# Patient Record
Sex: MALE | Race: WHITE | NOT HISPANIC OR LATINO | Employment: FULL TIME
[De-identification: names, ages, dates, MRNs, and addresses within clinical notes are randomized per-mention and may not be internally consistent; named-entity substitution may affect disease eponyms.]

---

## 2020-03-01 ENCOUNTER — HEALTH MAINTENANCE LETTER (OUTPATIENT)
Age: 50
End: 2020-03-01

## 2020-12-14 ENCOUNTER — HEALTH MAINTENANCE LETTER (OUTPATIENT)
Age: 50
End: 2020-12-14

## 2021-04-17 ENCOUNTER — HEALTH MAINTENANCE LETTER (OUTPATIENT)
Age: 51
End: 2021-04-17

## 2021-07-22 DIAGNOSIS — Z11.59 ENCOUNTER FOR SCREENING FOR OTHER VIRAL DISEASES: ICD-10-CM

## 2021-07-29 ENCOUNTER — LAB (OUTPATIENT)
Dept: LAB | Facility: CLINIC | Age: 51
End: 2021-07-29
Payer: COMMERCIAL

## 2021-07-29 DIAGNOSIS — Z11.59 ENCOUNTER FOR SCREENING FOR OTHER VIRAL DISEASES: ICD-10-CM

## 2021-07-29 LAB — SARS-COV-2 RNA RESP QL NAA+PROBE: NEGATIVE

## 2021-07-29 PROCEDURE — U0003 INFECTIOUS AGENT DETECTION BY NUCLEIC ACID (DNA OR RNA); SEVERE ACUTE RESPIRATORY SYNDROME CORONAVIRUS 2 (SARS-COV-2) (CORONAVIRUS DISEASE [COVID-19]), AMPLIFIED PROBE TECHNIQUE, MAKING USE OF HIGH THROUGHPUT TECHNOLOGIES AS DESCRIBED BY CMS-2020-01-R: HCPCS

## 2021-07-29 PROCEDURE — U0005 INFEC AGEN DETEC AMPLI PROBE: HCPCS

## 2021-07-29 RX ORDER — LISINOPRIL/HYDROCHLOROTHIAZIDE 10-12.5 MG
1 TABLET ORAL DAILY
COMMUNITY

## 2021-08-02 ENCOUNTER — ANESTHESIA (OUTPATIENT)
Dept: SURGERY | Facility: CLINIC | Age: 51
End: 2021-08-02
Payer: COMMERCIAL

## 2021-08-02 ENCOUNTER — ANESTHESIA EVENT (OUTPATIENT)
Dept: SURGERY | Facility: CLINIC | Age: 51
End: 2021-08-02
Payer: COMMERCIAL

## 2021-08-02 ENCOUNTER — HOSPITAL ENCOUNTER (OUTPATIENT)
Facility: CLINIC | Age: 51
Discharge: HOME | End: 2021-08-03
Attending: ORTHOPAEDIC SURGERY | Admitting: ORTHOPAEDIC SURGERY
Payer: COMMERCIAL

## 2021-08-02 ENCOUNTER — ANCILLARY PROCEDURE (OUTPATIENT)
Dept: ULTRASOUND IMAGING | Facility: CLINIC | Age: 51
End: 2021-08-02
Attending: ANESTHESIOLOGY
Payer: COMMERCIAL

## 2021-08-02 DIAGNOSIS — Z98.890 STATUS POST RIGHT ROTATOR CUFF REPAIR: Primary | ICD-10-CM

## 2021-08-02 LAB
CREAT SERPL-MCNC: 0.94 MG/DL (ref 0.7–1.3)
ERYTHROCYTE [DISTWIDTH] IN BLOOD BY AUTOMATED COUNT: 12.3 % (ref 10–15)
GFR SERPL CREATININE-BSD FRML MDRD: >90 ML/MIN/1.73M2
HCT VFR BLD AUTO: 50.1 % (ref 40–53)
HGB BLD-MCNC: 16.6 G/DL (ref 13.3–17.7)
HGB BLD-MCNC: 16.6 G/DL (ref 13.3–17.7)
HOLD SPECIMEN: NORMAL
MCH RBC QN AUTO: 31.1 PG (ref 26.5–33)
MCHC RBC AUTO-ENTMCNC: 33.1 G/DL (ref 31.5–36.5)
MCV RBC AUTO: 94 FL (ref 78–100)
PLATELET # BLD AUTO: 140 10E3/UL (ref 150–450)
POTASSIUM BLD-SCNC: 4.1 MMOL/L (ref 3.5–5)
RBC # BLD AUTO: 5.34 10E6/UL (ref 4.4–5.9)
WBC # BLD AUTO: 5.5 10E3/UL (ref 4–11)

## 2021-08-02 PROCEDURE — 250N000025 HC SEVOFLURANE, PER MIN: Performed by: ORTHOPAEDIC SURGERY

## 2021-08-02 PROCEDURE — 250N000011 HC RX IP 250 OP 636: Performed by: NURSE ANESTHETIST, CERTIFIED REGISTERED

## 2021-08-02 PROCEDURE — 250N000011 HC RX IP 250 OP 636: Performed by: PHYSICIAN ASSISTANT

## 2021-08-02 PROCEDURE — C9290 INJ, BUPIVACAINE LIPOSOME: HCPCS | Performed by: PHYSICIAN ASSISTANT

## 2021-08-02 PROCEDURE — 258N000001 HC RX 258: Performed by: ORTHOPAEDIC SURGERY

## 2021-08-02 PROCEDURE — 250N000009 HC RX 250: Performed by: NURSE ANESTHETIST, CERTIFIED REGISTERED

## 2021-08-02 PROCEDURE — 250N000009 HC RX 250: Performed by: ANESTHESIOLOGY

## 2021-08-02 PROCEDURE — 99207 PR CDG-CODE CATEGORY CHANGED: CPT | Performed by: INTERNAL MEDICINE

## 2021-08-02 PROCEDURE — 250N000013 HC RX MED GY IP 250 OP 250 PS 637: Performed by: INTERNAL MEDICINE

## 2021-08-02 PROCEDURE — 85027 COMPLETE CBC AUTOMATED: CPT | Performed by: PHYSICIAN ASSISTANT

## 2021-08-02 PROCEDURE — C1776 JOINT DEVICE (IMPLANTABLE): HCPCS | Performed by: ORTHOPAEDIC SURGERY

## 2021-08-02 PROCEDURE — 999N000141 HC STATISTIC PRE-PROCEDURE NURSING ASSESSMENT: Performed by: ORTHOPAEDIC SURGERY

## 2021-08-02 PROCEDURE — 250N000011 HC RX IP 250 OP 636: Performed by: ANESTHESIOLOGY

## 2021-08-02 PROCEDURE — 360N000077 HC SURGERY LEVEL 4, PER MIN: Performed by: ORTHOPAEDIC SURGERY

## 2021-08-02 PROCEDURE — 999N000157 HC STATISTIC RCP TIME EA 10 MIN

## 2021-08-02 PROCEDURE — 258N000003 HC RX IP 258 OP 636: Performed by: ANESTHESIOLOGY

## 2021-08-02 PROCEDURE — 94660 CPAP INITIATION&MGMT: CPT

## 2021-08-02 PROCEDURE — 710N000010 HC RECOVERY PHASE 1, LEVEL 2, PER MIN: Performed by: ORTHOPAEDIC SURGERY

## 2021-08-02 PROCEDURE — 250N000011 HC RX IP 250 OP 636: Performed by: ORTHOPAEDIC SURGERY

## 2021-08-02 PROCEDURE — 250N000013 HC RX MED GY IP 250 OP 250 PS 637: Performed by: PHYSICIAN ASSISTANT

## 2021-08-02 PROCEDURE — 36415 COLL VENOUS BLD VENIPUNCTURE: CPT | Performed by: PHYSICIAN ASSISTANT

## 2021-08-02 PROCEDURE — 370N000017 HC ANESTHESIA TECHNICAL FEE, PER MIN: Performed by: ORTHOPAEDIC SURGERY

## 2021-08-02 PROCEDURE — 84132 ASSAY OF SERUM POTASSIUM: CPT | Performed by: PHYSICIAN ASSISTANT

## 2021-08-02 PROCEDURE — 272N000001 HC OR GENERAL SUPPLY STERILE: Performed by: ORTHOPAEDIC SURGERY

## 2021-08-02 PROCEDURE — 99233 SBSQ HOSP IP/OBS HIGH 50: CPT | Performed by: INTERNAL MEDICINE

## 2021-08-02 PROCEDURE — C1713 ANCHOR/SCREW BN/BN,TIS/BN: HCPCS | Performed by: ORTHOPAEDIC SURGERY

## 2021-08-02 PROCEDURE — 82565 ASSAY OF CREATININE: CPT | Performed by: PHYSICIAN ASSISTANT

## 2021-08-02 DEVICE — SUTURETAPE, 1.3MM W/NEEDLE, WHITE/BLUE
Type: IMPLANTABLE DEVICE | Site: SHOULDER | Status: FUNCTIONAL
Brand: ARTHREX®

## 2021-08-02 DEVICE — IMP ANCHOR ARTHREX BIO-SWIVELOCK 4.75X19.1MM AR-2324BCC: Type: IMPLANTABLE DEVICE | Site: SHOULDER | Status: FUNCTIONAL

## 2021-08-02 DEVICE — 4.75MM BC KNOTLESS SWIVELOCK W/ TAPE
Type: IMPLANTABLE DEVICE | Site: SHOULDER | Status: FUNCTIONAL
Brand: ARTHREX®

## 2021-08-02 RX ORDER — FENTANYL CITRATE 50 UG/ML
25 INJECTION, SOLUTION INTRAMUSCULAR; INTRAVENOUS EVERY 5 MIN PRN
Status: DISCONTINUED | OUTPATIENT
Start: 2021-08-02 | End: 2021-08-02 | Stop reason: HOSPADM

## 2021-08-02 RX ORDER — IBUPROFEN 200 MG
600 TABLET ORAL EVERY 6 HOURS PRN
COMMUNITY

## 2021-08-02 RX ORDER — CEFAZOLIN SODIUM IN 0.9 % NACL 3 G/100 ML
3 INTRAVENOUS SOLUTION, PIGGYBACK (ML) INTRAVENOUS
Status: COMPLETED | OUTPATIENT
Start: 2021-08-02 | End: 2021-08-02

## 2021-08-02 RX ORDER — NALOXONE HYDROCHLORIDE 0.4 MG/ML
0.4 INJECTION, SOLUTION INTRAMUSCULAR; INTRAVENOUS; SUBCUTANEOUS
Status: DISCONTINUED | OUTPATIENT
Start: 2021-08-02 | End: 2021-08-03 | Stop reason: HOSPADM

## 2021-08-02 RX ORDER — IBUPROFEN 600 MG/1
600 TABLET, FILM COATED ORAL EVERY 6 HOURS PRN
Status: DISCONTINUED | OUTPATIENT
Start: 2021-08-02 | End: 2021-08-03 | Stop reason: HOSPADM

## 2021-08-02 RX ORDER — OXYCODONE HYDROCHLORIDE 5 MG/1
10 TABLET ORAL EVERY 4 HOURS PRN
Status: DISCONTINUED | OUTPATIENT
Start: 2021-08-02 | End: 2021-08-03 | Stop reason: HOSPADM

## 2021-08-02 RX ORDER — ONDANSETRON 4 MG/1
4 TABLET, ORALLY DISINTEGRATING ORAL EVERY 30 MIN PRN
Status: CANCELLED | OUTPATIENT
Start: 2021-08-02

## 2021-08-02 RX ORDER — HYDROXYZINE PAMOATE 25 MG/1
25-50 CAPSULE ORAL EVERY 4 HOURS PRN
Qty: 30 CAPSULE | Refills: 0 | Status: SHIPPED | OUTPATIENT
Start: 2021-08-02

## 2021-08-02 RX ORDER — BISACODYL 10 MG
10 SUPPOSITORY, RECTAL RECTAL DAILY PRN
Status: DISCONTINUED | OUTPATIENT
Start: 2021-08-02 | End: 2021-08-03 | Stop reason: HOSPADM

## 2021-08-02 RX ORDER — LIDOCAINE 40 MG/G
CREAM TOPICAL
Status: DISCONTINUED | OUTPATIENT
Start: 2021-08-02 | End: 2021-08-03 | Stop reason: HOSPADM

## 2021-08-02 RX ORDER — BUPIVACAINE HYDROCHLORIDE AND EPINEPHRINE 5; 5 MG/ML; UG/ML
INJECTION, SOLUTION PERINEURAL
Status: COMPLETED | OUTPATIENT
Start: 2021-08-02 | End: 2021-08-02

## 2021-08-02 RX ORDER — MAGNESIUM SULFATE 4 G/50ML
4 INJECTION INTRAVENOUS ONCE
Status: COMPLETED | OUTPATIENT
Start: 2021-08-02 | End: 2021-08-02

## 2021-08-02 RX ORDER — PROCHLORPERAZINE MALEATE 10 MG
10 TABLET ORAL EVERY 6 HOURS PRN
Status: DISCONTINUED | OUTPATIENT
Start: 2021-08-02 | End: 2021-08-03 | Stop reason: HOSPADM

## 2021-08-02 RX ORDER — EPINEPHRINE 0.3 MG/.3ML
0.3 INJECTION SUBCUTANEOUS PRN
COMMUNITY

## 2021-08-02 RX ORDER — ACETAMINOPHEN 325 MG/1
975 TABLET ORAL ONCE
Status: COMPLETED | OUTPATIENT
Start: 2021-08-02 | End: 2021-08-02

## 2021-08-02 RX ORDER — CEFAZOLIN SODIUM IN 0.9 % NACL 3 G/100 ML
3 INTRAVENOUS SOLUTION, PIGGYBACK (ML) INTRAVENOUS
Status: DISCONTINUED | OUTPATIENT
Start: 2021-08-02 | End: 2021-08-02

## 2021-08-02 RX ORDER — HYDROXYZINE HYDROCHLORIDE 25 MG/1
25-50 TABLET, FILM COATED ORAL EVERY 6 HOURS PRN
Status: DISCONTINUED | OUTPATIENT
Start: 2021-08-02 | End: 2021-08-03 | Stop reason: HOSPADM

## 2021-08-02 RX ORDER — FENTANYL CITRATE 50 UG/ML
50 INJECTION, SOLUTION INTRAMUSCULAR; INTRAVENOUS
Status: DISCONTINUED | OUTPATIENT
Start: 2021-08-02 | End: 2021-08-02 | Stop reason: HOSPADM

## 2021-08-02 RX ORDER — NALOXONE HYDROCHLORIDE 0.4 MG/ML
0.2 INJECTION, SOLUTION INTRAMUSCULAR; INTRAVENOUS; SUBCUTANEOUS
Status: DISCONTINUED | OUTPATIENT
Start: 2021-08-02 | End: 2021-08-03 | Stop reason: HOSPADM

## 2021-08-02 RX ORDER — ONDANSETRON 2 MG/ML
INJECTION INTRAMUSCULAR; INTRAVENOUS PRN
Status: DISCONTINUED | OUTPATIENT
Start: 2021-08-02 | End: 2021-08-02

## 2021-08-02 RX ORDER — HALOPERIDOL 5 MG/ML
1 INJECTION INTRAMUSCULAR
Status: CANCELLED | OUTPATIENT
Start: 2021-08-02

## 2021-08-02 RX ORDER — ONDANSETRON 2 MG/ML
4 INJECTION INTRAMUSCULAR; INTRAVENOUS EVERY 30 MIN PRN
Status: CANCELLED | OUTPATIENT
Start: 2021-08-02

## 2021-08-02 RX ORDER — CEFAZOLIN SODIUM IN 0.9 % NACL 3 G/100 ML
3 INTRAVENOUS SOLUTION, PIGGYBACK (ML) INTRAVENOUS SEE ADMIN INSTRUCTIONS
Status: DISCONTINUED | OUTPATIENT
Start: 2021-08-02 | End: 2021-08-02 | Stop reason: HOSPADM

## 2021-08-02 RX ORDER — SODIUM PHOSPHATE,MONO-DIBASIC 19G-7G/118
1 ENEMA (ML) RECTAL DAILY
COMMUNITY

## 2021-08-02 RX ORDER — ALBUTEROL SULFATE 0.83 MG/ML
2.5 SOLUTION RESPIRATORY (INHALATION) EVERY 4 HOURS PRN
Status: DISCONTINUED | OUTPATIENT
Start: 2021-08-02 | End: 2021-08-02 | Stop reason: HOSPADM

## 2021-08-02 RX ORDER — AMOXICILLIN 250 MG
1 CAPSULE ORAL 2 TIMES DAILY
Status: DISCONTINUED | OUTPATIENT
Start: 2021-08-02 | End: 2021-08-03 | Stop reason: HOSPADM

## 2021-08-02 RX ORDER — PROPOFOL 10 MG/ML
INJECTION, EMULSION INTRAVENOUS PRN
Status: DISCONTINUED | OUTPATIENT
Start: 2021-08-02 | End: 2021-08-02

## 2021-08-02 RX ORDER — POLYETHYLENE GLYCOL 3350 17 G/17G
17 POWDER, FOR SOLUTION ORAL DAILY
Status: DISCONTINUED | OUTPATIENT
Start: 2021-08-03 | End: 2021-08-03 | Stop reason: HOSPADM

## 2021-08-02 RX ORDER — SUMATRIPTAN 100 MG/1
100 TABLET, FILM COATED ORAL
COMMUNITY

## 2021-08-02 RX ORDER — CEFAZOLIN SODIUM 2 G/100ML
2 INJECTION, SOLUTION INTRAVENOUS
Status: DISCONTINUED | OUTPATIENT
Start: 2021-08-02 | End: 2021-08-02

## 2021-08-02 RX ORDER — ACETAMINOPHEN 325 MG/1
650 TABLET ORAL EVERY 4 HOURS PRN
Status: DISCONTINUED | OUTPATIENT
Start: 2021-08-05 | End: 2021-08-03 | Stop reason: HOSPADM

## 2021-08-02 RX ORDER — OXYCODONE HYDROCHLORIDE 5 MG/1
5-10 TABLET ORAL EVERY 4 HOURS PRN
Qty: 30 TABLET | Refills: 0 | Status: SHIPPED | OUTPATIENT
Start: 2021-08-02

## 2021-08-02 RX ORDER — KETAMINE HYDROCHLORIDE 10 MG/ML
INJECTION INTRAMUSCULAR; INTRAVENOUS PRN
Status: DISCONTINUED | OUTPATIENT
Start: 2021-08-02 | End: 2021-08-02

## 2021-08-02 RX ORDER — SODIUM CHLORIDE, SODIUM LACTATE, POTASSIUM CHLORIDE, CALCIUM CHLORIDE 600; 310; 30; 20 MG/100ML; MG/100ML; MG/100ML; MG/100ML
INJECTION, SOLUTION INTRAVENOUS CONTINUOUS
Status: DISCONTINUED | OUTPATIENT
Start: 2021-08-02 | End: 2021-08-03 | Stop reason: HOSPADM

## 2021-08-02 RX ORDER — LISINOPRIL/HYDROCHLOROTHIAZIDE 10-12.5 MG
1 TABLET ORAL DAILY
Status: DISCONTINUED | OUTPATIENT
Start: 2021-08-02 | End: 2021-08-03 | Stop reason: HOSPADM

## 2021-08-02 RX ORDER — PROPRANOLOL HYDROCHLORIDE 10 MG/1
10 TABLET ORAL DAILY PRN
COMMUNITY

## 2021-08-02 RX ORDER — ONDANSETRON 2 MG/ML
4 INJECTION INTRAMUSCULAR; INTRAVENOUS EVERY 6 HOURS PRN
Status: DISCONTINUED | OUTPATIENT
Start: 2021-08-02 | End: 2021-08-03 | Stop reason: HOSPADM

## 2021-08-02 RX ORDER — OXYCODONE HYDROCHLORIDE 5 MG/1
5 TABLET ORAL EVERY 4 HOURS PRN
Status: DISCONTINUED | OUTPATIENT
Start: 2021-08-02 | End: 2021-08-02

## 2021-08-02 RX ORDER — DEXAMETHASONE SODIUM PHOSPHATE 10 MG/ML
INJECTION, SOLUTION INTRAMUSCULAR; INTRAVENOUS PRN
Status: DISCONTINUED | OUTPATIENT
Start: 2021-08-02 | End: 2021-08-02

## 2021-08-02 RX ORDER — ACETAMINOPHEN 325 MG/1
975 TABLET ORAL EVERY 8 HOURS
Status: DISCONTINUED | OUTPATIENT
Start: 2021-08-02 | End: 2021-08-03 | Stop reason: HOSPADM

## 2021-08-02 RX ORDER — CEFAZOLIN SODIUM 2 G/100ML
2 INJECTION, SOLUTION INTRAVENOUS EVERY 8 HOURS
Status: COMPLETED | OUTPATIENT
Start: 2021-08-02 | End: 2021-08-03

## 2021-08-02 RX ORDER — HYDROMORPHONE HCL IN WATER/PF 6 MG/30 ML
0.2 PATIENT CONTROLLED ANALGESIA SYRINGE INTRAVENOUS EVERY 5 MIN PRN
Status: DISCONTINUED | OUTPATIENT
Start: 2021-08-02 | End: 2021-08-02 | Stop reason: HOSPADM

## 2021-08-02 RX ORDER — ONDANSETRON 4 MG/1
4 TABLET, ORALLY DISINTEGRATING ORAL EVERY 6 HOURS PRN
Status: DISCONTINUED | OUTPATIENT
Start: 2021-08-02 | End: 2021-08-03 | Stop reason: HOSPADM

## 2021-08-02 RX ORDER — HYDROMORPHONE HCL IN WATER/PF 6 MG/30 ML
0.2 PATIENT CONTROLLED ANALGESIA SYRINGE INTRAVENOUS
Status: DISCONTINUED | OUTPATIENT
Start: 2021-08-02 | End: 2021-08-03 | Stop reason: HOSPADM

## 2021-08-02 RX ORDER — SUMATRIPTAN 50 MG/1
100 TABLET, FILM COATED ORAL
Status: DISCONTINUED | OUTPATIENT
Start: 2021-08-02 | End: 2021-08-03 | Stop reason: HOSPADM

## 2021-08-02 RX ORDER — OXYCODONE HYDROCHLORIDE 5 MG/1
5 TABLET ORAL EVERY 4 HOURS PRN
Status: DISCONTINUED | OUTPATIENT
Start: 2021-08-02 | End: 2021-08-03 | Stop reason: HOSPADM

## 2021-08-02 RX ORDER — LIDOCAINE 40 MG/G
CREAM TOPICAL
Status: DISCONTINUED | OUTPATIENT
Start: 2021-08-02 | End: 2021-08-02 | Stop reason: HOSPADM

## 2021-08-02 RX ORDER — SODIUM CHLORIDE, SODIUM LACTATE, POTASSIUM CHLORIDE, CALCIUM CHLORIDE 600; 310; 30; 20 MG/100ML; MG/100ML; MG/100ML; MG/100ML
INJECTION, SOLUTION INTRAVENOUS CONTINUOUS
Status: DISCONTINUED | OUTPATIENT
Start: 2021-08-02 | End: 2021-08-02 | Stop reason: HOSPADM

## 2021-08-02 RX ORDER — FENTANYL CITRATE 50 UG/ML
INJECTION, SOLUTION INTRAMUSCULAR; INTRAVENOUS PRN
Status: DISCONTINUED | OUTPATIENT
Start: 2021-08-02 | End: 2021-08-02

## 2021-08-02 RX ORDER — CEFAZOLIN SODIUM 2 G/100ML
2 INJECTION, SOLUTION INTRAVENOUS SEE ADMIN INSTRUCTIONS
Status: DISCONTINUED | OUTPATIENT
Start: 2021-08-02 | End: 2021-08-02

## 2021-08-02 RX ORDER — HYDROMORPHONE HCL IN WATER/PF 6 MG/30 ML
0.4 PATIENT CONTROLLED ANALGESIA SYRINGE INTRAVENOUS
Status: DISCONTINUED | OUTPATIENT
Start: 2021-08-02 | End: 2021-08-03 | Stop reason: HOSPADM

## 2021-08-02 RX ORDER — MULTIVITAMIN WITH IRON
2 TABLET ORAL DAILY
COMMUNITY

## 2021-08-02 RX ORDER — LIDOCAINE HYDROCHLORIDE 10 MG/ML
INJECTION, SOLUTION INFILTRATION; PERINEURAL PRN
Status: DISCONTINUED | OUTPATIENT
Start: 2021-08-02 | End: 2021-08-02

## 2021-08-02 RX ORDER — MULTIVITAMIN,THERAPEUTIC
1 TABLET ORAL DAILY
COMMUNITY

## 2021-08-02 RX ORDER — ASPIRIN 325 MG
325 TABLET, DELAYED RELEASE (ENTERIC COATED) ORAL DAILY
Qty: 14 TABLET | COMMUNITY
Start: 2021-08-02

## 2021-08-02 RX ORDER — MEPERIDINE HYDROCHLORIDE 50 MG/ML
12.5 INJECTION INTRAMUSCULAR; INTRAVENOUS; SUBCUTANEOUS
Status: CANCELLED | OUTPATIENT
Start: 2021-08-02

## 2021-08-02 RX ADMIN — ROCURONIUM BROMIDE 20 MG: 10 INJECTION, SOLUTION INTRAVENOUS at 12:36

## 2021-08-02 RX ADMIN — FENTANYL CITRATE 50 MCG: 50 INJECTION, SOLUTION INTRAMUSCULAR; INTRAVENOUS at 10:55

## 2021-08-02 RX ADMIN — MAGNESIUM SULFATE HEPTAHYDRATE 4 G: 80 INJECTION, SOLUTION INTRAVENOUS at 11:04

## 2021-08-02 RX ADMIN — HYDROMORPHONE HYDROCHLORIDE 0.25 MG: 1 INJECTION, SOLUTION INTRAMUSCULAR; INTRAVENOUS; SUBCUTANEOUS at 12:39

## 2021-08-02 RX ADMIN — PROPOFOL 200 MG: 10 INJECTION, EMULSION INTRAVENOUS at 11:55

## 2021-08-02 RX ADMIN — ROCURONIUM BROMIDE 50 MG: 10 INJECTION, SOLUTION INTRAVENOUS at 11:55

## 2021-08-02 RX ADMIN — DOCUSATE SODIUM 50MG AND SENNOSIDES 8.6MG 1 TABLET: 8.6; 5 TABLET, FILM COATED ORAL at 20:20

## 2021-08-02 RX ADMIN — DEXAMETHASONE SODIUM PHOSPHATE 10 MG: 10 INJECTION, SOLUTION INTRAMUSCULAR; INTRAVENOUS at 11:55

## 2021-08-02 RX ADMIN — FENTANYL CITRATE 50 MCG: 50 INJECTION, SOLUTION INTRAMUSCULAR; INTRAVENOUS at 11:55

## 2021-08-02 RX ADMIN — ACETAMINOPHEN 975 MG: 325 TABLET ORAL at 20:20

## 2021-08-02 RX ADMIN — SUGAMMADEX 300 MG: 100 INJECTION, SOLUTION INTRAVENOUS at 14:41

## 2021-08-02 RX ADMIN — ASPIRIN 325 MG: 325 TABLET, COATED ORAL at 20:20

## 2021-08-02 RX ADMIN — KETAMINE HYDROCHLORIDE 50 MG: 10 INJECTION, SOLUTION INTRAMUSCULAR; INTRAVENOUS at 12:36

## 2021-08-02 RX ADMIN — ACETAMINOPHEN 975 MG: 325 TABLET ORAL at 10:20

## 2021-08-02 RX ADMIN — MIDAZOLAM HYDROCHLORIDE 2 MG: 1 INJECTION, SOLUTION INTRAMUSCULAR; INTRAVENOUS at 10:55

## 2021-08-02 RX ADMIN — SODIUM CHLORIDE, POTASSIUM CHLORIDE, SODIUM LACTATE AND CALCIUM CHLORIDE: 600; 310; 30; 20 INJECTION, SOLUTION INTRAVENOUS at 13:46

## 2021-08-02 RX ADMIN — HYDROMORPHONE HYDROCHLORIDE 0.5 MG: 1 INJECTION, SOLUTION INTRAMUSCULAR; INTRAVENOUS; SUBCUTANEOUS at 14:32

## 2021-08-02 RX ADMIN — SODIUM CHLORIDE, POTASSIUM CHLORIDE, SODIUM LACTATE AND CALCIUM CHLORIDE: 600; 310; 30; 20 INJECTION, SOLUTION INTRAVENOUS at 11:03

## 2021-08-02 RX ADMIN — LISINOPRIL AND HYDROCHLOROTHIAZIDE 1 TABLET: 12.5; 1 TABLET ORAL at 20:21

## 2021-08-02 RX ADMIN — ONDANSETRON 4 MG: 2 INJECTION INTRAMUSCULAR; INTRAVENOUS at 14:32

## 2021-08-02 RX ADMIN — BUPIVACAINE 10 ML: 13.3 INJECTION, SUSPENSION, LIPOSOMAL INFILTRATION at 11:04

## 2021-08-02 RX ADMIN — FENTANYL CITRATE 50 MCG: 50 INJECTION, SOLUTION INTRAMUSCULAR; INTRAVENOUS at 11:49

## 2021-08-02 RX ADMIN — BUPIVACAINE HYDROCHLORIDE AND EPINEPHRINE BITARTRATE 15 ML: 5; .005 INJECTION, SOLUTION PERINEURAL at 11:13

## 2021-08-02 RX ADMIN — Medication 3 G: at 12:15

## 2021-08-02 RX ADMIN — HYDROMORPHONE HYDROCHLORIDE 0.25 MG: 1 INJECTION, SOLUTION INTRAMUSCULAR; INTRAVENOUS; SUBCUTANEOUS at 12:52

## 2021-08-02 RX ADMIN — CEFAZOLIN SODIUM 2 G: 2 INJECTION, SOLUTION INTRAVENOUS at 20:21

## 2021-08-02 RX ADMIN — ROCURONIUM BROMIDE 10 MG: 10 INJECTION, SOLUTION INTRAVENOUS at 13:03

## 2021-08-02 ASSESSMENT — MIFFLIN-ST. JEOR: SCORE: 2512.79

## 2021-08-02 NOTE — PHARMACY-ADMISSION MEDICATION HISTORY
Pharmacist completed medication history with the patient while in the [facility].  Prior to admission (PTA) med list completed and updated in the electronic medical record (EMR).  Pharmacy Note - Admission Medication History  Pertinent Provider Information: none   ______________________________________________________________________  Prior To Admission (PTA) med list completed and updated in EMR.     Medications Prior to Admission   Medication Sig Dispense Refill Last Dose     EPINEPHrine (ANY BX GENERIC EQUIV) 0.3 MG/0.3ML injection 2-pack Inject 0.3 mg into the muscle as needed for anaphylaxis   never     glucosamine-chondroitin 500-400 MG CAPS per capsule Take 1 capsule by mouth daily   More than a month     ibuprofen (ADVIL/MOTRIN) 200 MG tablet Take 600 mg by mouth every 6 hours as needed for mild pain   7/19/2021     lisinopril-hydrochlorothiazide (ZESTORETIC) 10-12.5 MG tablet Take 1 tablet by mouth daily   7/31/2021     magnesium 250 MG tablet Take 2 tablets by mouth daily   7/31/2021     multivitamin, therapeutic (THERA-VIT) TABS tablet Take 1 tablet by mouth daily   7/31/2021     propranolol (INDERAL) 10 MG tablet Take 10 mg by mouth daily as needed (prior to speaking)   More than a month     SUMAtriptan (IMITREX) 100 MG tablet Take 100 mg by mouth at onset of headache for migraine (repeat in two hours one time (max 200mg /24hrs))   More than a month         Information source(s): Patient and CareEverywhere/SureScripts  Patient was asked about OTC/herbal products specifically.  PTA med list reflects this.  Based on the pharmacist s assessment, the PTA med list information appears reliable  Allergies were reviewed, assessed, and updated with the patient.    Medications available for use during hospital stay: NONE  Thank you for the opportunity to participate in the care of this patient.    The patient was asked about OTC/herbal products specifically and the PTA med list reflects the patient's  response.    Allergies were reviewed and assessed with the patient, responses were updated in the EMR.    Thank you for the opportunity to participate in the care of this patient.    Ar Thorne RPH 8/2/2021 10:48 AM

## 2021-08-02 NOTE — PROVIDER NOTIFICATION
Out from the OR and placed on BiPAP ST 12/5, RR 16, 30% for tachypnea. Has history of JOSE ANTONIO - per [facility] off CPAP for 3 months with the use of an oral appliance. Continue at this time as needed.       08/02/21 1511   Tech Time   $Tech Time (10 minute increments) 3   Mode: CPAP/ BiPAP/ AVAPS/ AVAPS AE   CPAP/BiPAP/ AVAPS/ AVAPS AE Mode BiPAP S/T   Equipment   Device Serial Number [id number]   CPAP/BiPAP/Settings   $BIPAP/CPAP Therapy continuous   IPAP/EPAP (cmH2O) 12/5   Rate (breaths/min) 16   Oxygen (%) 30   Timed Inspiration (sec) 0.9   IPAP Rise Time (sec) 1 sec   CPAP/BiPAP Patient Parameters   IPAP (cm H2O) 12 cmH2O   EPAP (cm H2O) 5 cmH2O   RR Total (breaths/min) 35 breaths/min   Vt (mL) 734 mL   Minute Ventilation (L/min) 21.6 L/min   Peak Inspiratory Pressure (cm H2O) 13 cmH2O   Pt.  Leak (L/min) 50 L/min   CPAP/BiPAP/AVAPS/AVAPS AE Alarms   High Pressure (cm H2O) 17 cmH2O   Low Pressure (cm H2O) 5   Low Pressure Delay (sec) 20 sec   Lo Min Vent 3   High Rate (breaths/min) 45 breaths/min   Low Rate (breaths/min) 14   Audible Alarm set at (Volume of Alarm) 7   CPAP/BiPAP/AVAPS/AVAPS AE Patient Assessment   Interface Face Mask - Large   Skin Integrity no breakdown   RT Device Skin Assessment   Oxygen Delivery Device CPAP/BiPAP Mask   Site Appearance neck circumference Clean and dry   Site Appearance bridge of nose Clean and dry   Strap Tightness Finger Allowance between head and device strap   Skin Interventions Taken No adjustments needed   Respiratory WDL   Respiratory WDL rhythm/pattern   Rhythm/Pattern, Respiratory tachypneic

## 2021-08-02 NOTE — ANESTHESIA PROCEDURE NOTES
Interscalene Procedure Note  Pre-Procedure   Staff -        Anesthesiologist:  eMlissa Nesbitt MD       Performed By: anesthesiologist       Location: pre-op       Procedure Start/Stop Times: 8/2/2021 10:58 AM and 8/2/2021 11:05 AM       Pre-Anesthestic Checklist: patient identified, IV checked, site marked, risks and benefits discussed, informed consent, monitors and equipment checked, pre-op evaluation, at physician/surgeon's request and post-op pain management  Timeout:       Correct Patient: Yes        Correct Procedure: Yes        Correct Site: Yes        Correct Position: Yes        Correct Laterality: Yes        Site Marked: Yes  Procedure Documentation  Procedure: Interscalene       Laterality: right       Patient Position: supine       Patient Prep/Sterile Barriers: sterile gloves, mask       Skin prep: Chloraprep       Needle Type: short bevel       Needle Gauge: 20.        Needle Length (Inches): 4        Ultrasound guided       1. Ultrasound was used to identify targeted nerve, plexus, vascular marker, or fascial plane and place a needle adjacent to it in real-time.       2. Ultrasound was used to visualize the spread of anesthetic in close proximity to the above referenced structure.       3. A permanent image is entered into the patient's record.       4. The visualized anatomic structures appeared normal.       5. There were no apparent abnormal pathologic findings.    Assessment/Narrative         The placement was negative for: blood aspirated, painful injection and site bleeding       Paresthesias: No.      Bolus given via needle. No blood aspirated via catheter.        Secured via.        Insertion/Infusion Method: Single Shot       Complications: none    Medication(s) Administered   Bupivacaine 0.5% w/ 1:200K Epi (Other), 15 mL

## 2021-08-02 NOTE — ANESTHESIA CARE TRANSFER NOTE
Patient: Sander Stafford    Procedure(s):  RIGHT SHOULDER ARTHROSCOPY ROTATOR CUFF REPAIR  CONVERTED TO OPEN ROTATOR CUFF REPAIR    Diagnosis: Right shoulder pain [M25.511]  Diagnosis Additional Information: No value filed.    Anesthesia Type:   General     Note:  Anesthesia Care Transfer Notewriter  Vitals:  Vitals Value Taken Time   /86 08/02/21 1513   Temp     Pulse 80 08/02/21 1514   Resp 35 08/02/21 1514   SpO2 96 % 08/02/21 1514   Vitals shown include unvalidated device data.    Electronically Signed By: CONSTANCE LINARES CRNA  August 2, 2021  3:15 PM

## 2021-08-02 NOTE — OP NOTE
Operative Note    Name:  Sander Stafford  :  1970  MRN:  [id number]  Procedure Date:  2021    Preoperative Diagnosis:     Right Shoulder Rotator cuff tear    Postoperative Diagnosis:  Same with massive rotator cuff tear acute supraspinatus infraspinatus and subacute subscapularis tear    Procedures:  1.Right shoulder open rotator cuff repair  2. Right shoulder arthroscopy and debridement    Surgeon(s):   Rakesh Gonzalez MD, M.D.     Assistants:  Jossie Dent PA-C PA-C assistance was required due to the complexity of the procedure, for patient positioning, instrumentation assistance, soft tissue retraction and patient safety.      Circulator: Mojgan Bernabe RN; Rolando Lucas RN  Relief Circulator: Karrie Feliciano RN; Anna Marquez RN  Scrub Person: Diamond Banerjee    Anesthesia:   General and Interscalene block    Estimated Blood Loss: 50cc    Condition on discharge from OR: Stable    Drains: none     Specimens: None    Tissue Removed, Not Sent: None    Complications: none     Disposition:  Stable and awake to postanesthesia recovery..    INDICATION FOR OPERATION:  Sander Stafford is a 50 year old male with a history of shoulder pain and weakness and he has failed nonsurgical treatment. MRI showed evidence of  Large rotator cuff tear of the shoulder. Recommended he undergo shoulder rotator cuff repair.   Risks and benefits of the planned procedure as well as details of surgery were discussed with the patient. Consent was obtained.     REPORT OF OPERATION   The patient brought to the operating room and placed supine on the operating table. An interscalene block was placed by anesthesia preoperatively and given appropriate preoperative antibiotic. After induction of general anesthesia the patient was placed in the beachchair position on the OR table. On examination under anesthesia findings include full passive range of motion of the shoulder, no stiffness, no instability. The shoulder was  then prepped and draped in normal sterile fashion.    A standard posterior glenohumeral arthroscopy portal was established.  Diagnostic arthroscopy was performed. Arthroscopic findings include:    1. No significant arthritis of the glenohumeral joint articular surface.   2. Full thickness acute rotator cuff tear of the supraspinatus and infraspinatus, retracted but mobile  3. Subacute to chronic tear of the upper 1.5 cm of the subscapularis with chronic medial biceps subluxation  4. Mild subacromial impingement   5. Degenerative labral tearing      An anterior portal was established in the rotator interval and a cannula was placed.  A debridement of the glenohumeral joint was performed using a shaver to debride the labrum back to stable tissue.  No significant labral detachment was noted.       The scope was removed and placed in subacromial space. A complete subacromial bursectomy was performed. Bursal edge of the rotator cuff was débrided back to healthy cuff tissue.  Rotator cuff was probed and noted to be mobile to the bone bed.  Traction sutures were placed in the supraspinatus and infraspinatus to facilitate later repair.  Subscapularis is noted to have chronic tearing of the upper 1.5 cm of the subscapularis with chronic medial biceps subluxation.      Due to the large size of the tear and difficulty with visualization due to the body habitus it was elected to convert to an open repair.  Anterolateral deltoid splitting incision was made in line with the lateral portal.  Deep retractors placed below the deltoid and significant subacromial and subdeltoid adhesions and bursal tissue was resected.  Bone both repaired on the greater and lesser tuberosities down to bleeding bony surface using a rongeur and curette.  Subscapularis was repaired with 2 Medial Row anchors, one 4.75 swivel lock and one 2.6 mm fiber tack anchor.  These were passed through the cuff sequentially in a mattress fashion retrieved and tied and  suture tails brought out laterally to a 4.75 swivel lock anchor in the upper aspect of biceps groove.  Excellent to row repair of the subscap is accomplished.  Remnant of the biceps tendon was tenodesed to the anchor anteriorly.   Supraspinatus and infraspinatus and then repaired with speed bridge repair with three medial row 4.75 swivel lock anchors with fiber tapes and two 4.75 swivel lock anchors laterally.  In addition two side to side sutures at placed posteriorly across the split in the posterior aspect of the tear with suture tapes.  Excellent secure repair of the cuff is noted.  Minor open acromioplasty was formed using rongeur of the anterolateral acromion.  Incisions copiously with saline irrigation deltoid is closed with interrupted suture tape and Ethibond sutures.  Skin was closed with 0 Vicryl and 2-0 Vicryl subcutaneous closure and 3-0 Monocryl subcuticular closure.  Sterile dressings placed on the shoulder was placed in a shoulder slingshot brace transferred in stable condition to postanesthesia recovery.  He will be maintained in a sling for 6 weeks postop, progressing with physical therapy 3 to 4 weeks postop for passive range of motion, active assisted range of motion at 6 to 8 weeks postop, active range of motion in 8 weeks postop and strengthening at 12 weeks postop.          All instruments removed and incisions closed using 3-0 Monocryl subcuticular sutures, benzoin and Steri-Strips. Sterile dressings was placed on the shoulder, he was placed in a shoulder sling and transferred in stable awake condition to postanesthesia recovery.      Rakesh Gonzalez MD, M.D.   Date: 8/2/2021  Time: 3:03 PM    Implants:  Implant Name Type Inv. Item Serial No.  Lot No. LRB No. Used Action   IMP ANCHOR ARTHREX BIO-SWIVELOCK 4.75X19.1MM AR-[id number] - [id number] Metallic Hardware/[place] IMP ANCHOR ARTHREX BIO-SWIVELOCK 4.75X19.1MM AR-2324BCC  ARTHREX [id number] Right 3 Implanted   SUTURETAPE 1.3MM  W/TAILS WHITE/BLUE - [id number] Metallic Hardware/[place] SUTURETAPE 1.3MM W/TAILS WHITE/BLUE  ARTHREX [id number] Right 3 Implanted   SELF PUNCHING 2.6 FIBER AKM    ARTHREX [id number] Right 1 Implanted   IMP ANCHOR ARTHREX BIO-SWIVELOCK 4.75X19.1MM AR-2324BCC - [id number] Metallic Hardware/[place] IMP ANCHOR ARTHREX BIO-SWIVELOCK 4.75X19.1MM AR-[id number]  ARTHREX [id number] Right 1 Implanted   self-punching 2.6 fiber kam    ARTHREX [id number] Right 1 Wasted   IMP ANCHOR ARTHREX BIO-SWIVELOCK 4.75X19.1MM AR-2324BCC - [id number] Metallic Hardware/[place] IMP ANCHOR ARTHREX BIO-SWIVELOCK 4.75X19.1MM AR-2324BCC  [place] [id number] Right 1 Implanted   4.75 knotlessanchor    ARTHREX [id number] Right 1 Implanted   BIO-COMPOSITE KNOTLESS SWIVELOCK 4.75 X 19.1    ARTHREX [id number] Right 1 Implanted

## 2021-08-02 NOTE — ANESTHESIA POSTPROCEDURE EVALUATION
Patient: Sander Stafford    Procedure(s):  RIGHT SHOULDER ARTHROSCOPY ROTATOR CUFF REPAIR  CONVERTED TO OPEN ROTATOR CUFF REPAIR    Diagnosis:Right shoulder pain [M25.511]  Diagnosis Additional Information: No value filed.    Anesthesia Type:  General    Note:  Disposition: Inpatient   Postop Pain Control: Uneventful            Sign Out: Well controlled pain   PONV: No   Neuro/Psych: Uneventful            Sign Out: Acceptable/Baseline neuro status   Airway/Respiratory: Uneventful            Sign Out: Acceptable/Baseline resp. status   CV/Hemodynamics: Uneventful            Sign Out: Acceptable CV status; No obvious hypovolemia; No obvious fluid overload   Other NRE: NONE   DID A NON-ROUTINE EVENT OCCUR? No    Event details/Postop Comments:  Will stay in hospital secondary to JOSE ANTONIO no CPAP at home.           Last vitals:  Vitals Value Taken Time   /86 08/02/21 1650   Temp 36.3  C (97.4  F) 08/02/21 1600   Pulse 79 08/02/21 1653   Resp 47 08/02/21 1653   SpO2 97 % 08/02/21 1653   Vitals shown include unvalidated device data.    Electronically Signed By: Melissa Nesbitt MD  August 2, 2021  4:54 PM   No

## 2021-08-02 NOTE — ANESTHESIA PROCEDURE NOTES
Airway         Procedure Start/Stop Times: 8/2/2021 12:01 PM  Staff -        CRNA: Ronna Forrest APRN CRNA       Performed By: CRNA  Consent for Airway        Urgency: elective  Indications and Patient Condition       Indications for airway management: wiltno-procedural       Induction type:intravenous       Mask difficulty assessment: 1 - vent by mask    Final Airway Details       Final airway type: endotracheal airway       Successful airway: ETT - single  Endotracheal Airway Details        ETT size (mm): 8.0       Cuffed: yes       Successful intubation technique: direct laryngoscopy       DL Blade Type: Cortes 2       Grade View of Cords: 1       Adjucts: stylet and tooth guard       Position: Left       Measured from: gums/teeth       Secured at (cm): 22    Post intubation assessment        Placement verified by: capnometry, equal breath sounds and chest rise        Number of attempts at approach: 1       Secured with: plastic tape       Ease of procedure: easy       Dentition: Intact

## 2021-08-02 NOTE — ANESTHESIA PREPROCEDURE EVALUATION
Anesthesia Pre-Procedure Evaluation    Patient: Sander Stafford   MRN: [id number] : 1970        Preoperative Diagnosis: Right shoulder pain [M25.511]   Procedure : Procedure(s):  RIGHT SHOULDER ARTHROSCOPY ROTATOR CUFF REPAIR  POSSIBLE OPEN ROTATOR CUFF REPAIR     Past Medical History:   Diagnosis Date     Arthritis      Hypertension       History reviewed. No pertinent surgical history.   No Known Allergies   Social History     Tobacco Use     Smoking status: Never Smoker     Smokeless tobacco: Never Used   Substance Use Topics     Alcohol use: Yes     Comment: occasional      Wt Readings from Last 1 Encounters:   21 (!) 165.7 kg (365 lb 3.2 oz)        Anesthesia Evaluation   Pt has not had prior anesthetic         ROS/MED HX  ENT/Pulmonary:     (+) sleep apnea, mild, doesn't use CPAP, JOSE ANTONIO risk factors, obese,     Neurologic:  - neg neurologic ROS     Cardiovascular:     (+) hypertension-----    METS/Exercise Tolerance:     Hematologic:  - neg hematologic  ROS     Musculoskeletal:   (+) arthritis,     GI/Hepatic:  - neg GI/hepatic ROS     Renal/Genitourinary:  - neg Renal ROS     Endo:     (+) Obesity (BMI 52),     Psychiatric/Substance Use:       Infectious Disease:       Malignancy:  - neg malignancy ROS     Other:            Physical Exam    Airway  airway exam normal      Mallampati: II   TM distance: > 3 FB   Neck ROM: full     Respiratory Devices and Support         Dental  no notable dental history     (+) caps    B=Bridge, C=Chipped, L=Loose, M=Missing    Cardiovascular   cardiovascular exam normal       Rhythm and rate: regular and normal     Pulmonary   pulmonary exam normal        breath sounds clear to auscultation           OUTSIDE LABS:  CBC: No results found for: WBC, HGB, HCT, PLT  BMP: No results found for: NA, POTASSIUM, CHLORIDE, CO2, BUN, CR, GLC  COAGS: No results found for: PTT, INR, FIBR  POC: No results found for: BGM, HCG, HCGS  HEPATIC: No results found for: ALBUMIN,  PROTTOTAL, ALT, AST, GGT, ALKPHOS, BILITOTAL, BILIDIRECT, YONI  OTHER: No results found for: PH, LACT, A1C, JUDIT, PHOS, MAG, LIPASE, AMYLASE, TSH, T4, T3, CRP, SED    Anesthesia Plan    ASA Status:  4      Anesthesia Type: General.     - Airway: ETT   Induction: Intravenous.   Maintenance: Balanced.        Consents    Anesthesia Plan(s) and associated risks, benefits, and realistic alternatives discussed. Questions answered and patient/representative(s) expressed understanding.     - Discussed with:  Patient         Postoperative Care    Pain management: Peripheral nerve block (Single Shot), Multi-modal analgesia.        Comments:    Possible admission for JOSE ANTONIO no CPAP discussed            Melissa Nesbitt MD

## 2021-08-02 NOTE — ANESTHESIA CARE TRANSFER NOTE
Patient: Sander Stafford    Procedure(s):  RIGHT SHOULDER ARTHROSCOPY ROTATOR CUFF REPAIR  CONVERTED TO OPEN ROTATOR CUFF REPAIR    Diagnosis: Right shoulder pain [M25.511]  Diagnosis Additional Information: No value filed.    Anesthesia Type:   General     Note:    Oropharynx: oropharynx clear of all foreign objects  Level of Consciousness: awake      Independent Airway: airway patency satisfactory and stable (RT at bedside/bipap)  Dentition: dentition unchanged  Vital Signs Stable: post-procedure vital signs reviewed and stable  Report to RN Given: handoff report given  Patient transferred to: PACU    Handoff Report: Identifed the Patient, Identified the Reponsible Provider, Reviewed the pertinent medical history, Discussed the surgical course, Reviewed Intra-OP anesthesia mangement and issues during anesthesia, Set expectations for post-procedure period and Allowed opportunity for questions and acknowledgement of understanding      Vitals:  Vitals Value Taken Time   /86 08/02/21 1513   Temp     Pulse 80 08/02/21 1515   Resp 33 08/02/21 1515   SpO2 96 % 08/02/21 1515   Vitals shown include unvalidated device data.    Electronically Signed By: CONSTANCE LINARES CRNA  August 2, 2021  3:16 PM

## 2021-08-03 ENCOUNTER — APPOINTMENT (OUTPATIENT)
Dept: OCCUPATIONAL THERAPY | Facility: CLINIC | Age: 51
End: 2021-08-03
Attending: ORTHOPAEDIC SURGERY
Payer: COMMERCIAL

## 2021-08-03 VITALS
HEART RATE: 75 BPM | HEIGHT: 70 IN | RESPIRATION RATE: 16 BRPM | TEMPERATURE: 98.8 F | BODY MASS INDEX: 45.1 KG/M2 | SYSTOLIC BLOOD PRESSURE: 117 MMHG | DIASTOLIC BLOOD PRESSURE: 60 MMHG | OXYGEN SATURATION: 94 % | WEIGHT: 315 LBS

## 2021-08-03 LAB
FASTING STATUS PATIENT QL REPORTED: NO
GLUCOSE BLD-MCNC: 130 MG/DL (ref 70–125)
HGB BLD-MCNC: 15.4 G/DL (ref 13.3–17.7)

## 2021-08-03 PROCEDURE — 85018 HEMOGLOBIN: CPT | Performed by: PHYSICIAN ASSISTANT

## 2021-08-03 PROCEDURE — 250N000013 HC RX MED GY IP 250 OP 250 PS 637: Performed by: PHYSICIAN ASSISTANT

## 2021-08-03 PROCEDURE — 97535 SELF CARE MNGMENT TRAINING: CPT | Mod: GO

## 2021-08-03 PROCEDURE — 36415 COLL VENOUS BLD VENIPUNCTURE: CPT | Performed by: INTERNAL MEDICINE

## 2021-08-03 PROCEDURE — 250N000011 HC RX IP 250 OP 636: Performed by: PHYSICIAN ASSISTANT

## 2021-08-03 PROCEDURE — 99232 SBSQ HOSP IP/OBS MODERATE 35: CPT | Performed by: INTERNAL MEDICINE

## 2021-08-03 PROCEDURE — 97166 OT EVAL MOD COMPLEX 45 MIN: CPT | Mod: GO

## 2021-08-03 PROCEDURE — 82947 ASSAY GLUCOSE BLOOD QUANT: CPT | Performed by: INTERNAL MEDICINE

## 2021-08-03 PROCEDURE — 97110 THERAPEUTIC EXERCISES: CPT | Mod: GO

## 2021-08-03 RX ORDER — ACETAMINOPHEN 325 MG/1
975 TABLET ORAL EVERY 8 HOURS
Qty: 90 TABLET | Refills: 0 | Status: SHIPPED | OUTPATIENT
Start: 2021-08-03

## 2021-08-03 RX ORDER — AMOXICILLIN 250 MG
1 CAPSULE ORAL 2 TIMES DAILY
Qty: 30 TABLET | Refills: 0 | Status: SHIPPED | OUTPATIENT
Start: 2021-08-03

## 2021-08-03 RX ADMIN — DOCUSATE SODIUM 50MG AND SENNOSIDES 8.6MG 1 TABLET: 8.6; 5 TABLET, FILM COATED ORAL at 07:51

## 2021-08-03 RX ADMIN — ACETAMINOPHEN 975 MG: 325 TABLET ORAL at 03:05

## 2021-08-03 RX ADMIN — CEFAZOLIN SODIUM 2 G: 2 INJECTION, SOLUTION INTRAVENOUS at 03:06

## 2021-08-03 RX ADMIN — POLYETHYLENE GLYCOL 3350 17 G: 17 POWDER, FOR SOLUTION ORAL at 07:50

## 2021-08-03 RX ADMIN — ACETAMINOPHEN 975 MG: 325 TABLET ORAL at 11:29

## 2021-08-03 NOTE — DISCHARGE SUMMARY
ORTHOPEDIC DISCHARGE SUMMARY       Sander Stafford,  1970, MRN [id number]    Admission Date: 2021  Admission Diagnoses: Right shoulder pain [M25.511]  S/P right rotator cuff repair [Z98.890]     Discharge Date:  8/3/21    Post-operative Day:  1 Day Post-Op    Reason for Admission: The patient was admitted for the following:  Procedure(s):  RIGHT SHOULDER ARTHROSCOPY ROTATOR CUFF REPAIR  CONVERTED TO OPEN ROTATOR CUFF REPAIR      BRIEF HOSPITAL COURSE   This 50 year old male underwent the aforementioned procedure with Dr. Gonzalez on 21. There were no intraoperative complications and the patient was transferred to the recovery room and later the orthopedic unit in stable condition. Once the patient reached the orthopedic floor our orthopedic pain protocol was implemented along with the following:    Anticoagulation Medications: ASA  Therapy: OT  Activity: NWB  Bracing: Slingshot Brace    Consultations during Admission: Hospitalist service for medical management     COMPLICATIONS/SIGNIFICANT FINDINGS    None    DISCHARGE INFORMATION   Condition at discharge: Good  Discharge destination: Home  Patient was seen by myself on the date of discharge.    FOLLOW UP CARE   Follow up with orthopedics in 2 weeks or sooner should the need arise. Ortho will continue to manage pain control, post op anticoagulation and incision care.     Follow up with your PCP for management of chronic medical problems and to evaluate for post op medical complications including constipation, nausea/vomiting, DVT/PE, anemia, changes in blood pressure, fevers/chills, urinary retention and atelectasis/pneumonia.     Subjective   Patient is doing well today. Patient has passed HIS therapies. HE is using Tylenol for pain which HE is tolerating well. HE feels ready to discharge home. No other complaints. All questions answered.     Physical Exam   The patient is A&Ox3. Appears comfortable.   Sensation is intact.  Wrist ROM and   "strength is intact.  Radial pulse intact.  The incision is covered. Dressing C/D/I. Sling in place.     /60 (BP Location: Left arm)   Pulse 75   Temp 98.8  F (37.1  C) (Oral)   Resp 16   Ht 1.778 m (5' 10\")   Wt (!) 165.7 kg (365 lb 3.2 oz)   SpO2 94%   BMI 52.40 kg/m      Pertinent Results at Discharge     Hemoglobin   Date/Time Value Ref Range Status   08/03/2021 07:15 AM 15.4 13.3 - 17.7 g/dL Final   08/02/2021 10:40 AM 16.6 13.3 - 17.7 g/dL Final   08/02/2021 10:40 AM 16.6 13.3 - 17.7 g/dL Final     Platelet Count   Date/Time Value Ref Range Status   08/02/2021 10:40  (L) 150 - 450 10e3/uL Final       Medications at Discharge      Karson Stafford   Home Medication Instructions ALLIE:[id number]    Printed on:08/03/21 1724   Medication Information                      acetaminophen (TYLENOL) 325 MG tablet  Take 3 tablets (975 mg) by mouth every 8 hours             aspirin (ASA) 325 MG EC tablet  Take 1 tablet (325 mg) by mouth daily For 2 weeks.             EPINEPHrine (ANY BX GENERIC EQUIV) 0.3 MG/0.3ML injection 2-pack  Inject 0.3 mg into the muscle as needed for anaphylaxis             glucosamine-chondroitin 500-400 MG CAPS per capsule  Take 1 capsule by mouth daily             hydrOXYzine (VISTARIL) 25 MG capsule  Take 1-2 capsules (25-50 mg) by mouth every 4 hours as needed for itching or other             ibuprofen (ADVIL/MOTRIN) 200 MG tablet  Take 600 mg by mouth every 6 hours as needed for mild pain             lisinopril-hydrochlorothiazide (ZESTORETIC) 10-12.5 MG tablet  Take 1 tablet by mouth daily             magnesium 250 MG tablet  Take 2 tablets by mouth daily             multivitamin, therapeutic (THERA-VIT) TABS tablet  Take 1 tablet by mouth daily             oxyCODONE (ROXICODONE) 5 MG tablet  Take 1-2 tablets (5-10 mg) by mouth every 4 hours as needed for pain (post surgical pain)             propranolol (INDERAL) 10 MG tablet  Take 10 mg by mouth daily as needed (prior to " speaking)             senna-docusate (SENOKOT-S/PERICOLACE) 8.6-50 MG tablet  Take 1 tablet by mouth 2 times daily             SUMAtriptan (IMITREX) 100 MG tablet  Take 100 mg by mouth at onset of headache for migraine (repeat in two hours one time (max 200mg /24hrs))                 Problem List   Active Problems:    S/P right rotator cuff repair        Tammi Spaulding PA-C, SAADIA  Date: 8/3/2021  Time: 11:59 AM

## 2021-08-03 NOTE — PLAN OF CARE
Patient vital signs are at baseline: yes  Patient able to ambulate as they were prior to admission or with assist devices provided by therapies during their stay: yes  Patient MUST void prior to discharge: yes  Patient able to tolerate oral intake: yes  Pain has adequate pain control using oral analgesics: yes  Will try oral tonight: ----  Notes:  Improving RUE numbness/tingling after peripheral block. CMS otherwise intact. Right shoulder mepilex dressing with old small sanguineous drainage. Dressing Otherwise intact.

## 2021-08-03 NOTE — PLAN OF CARE
Patient vital signs are at baseline: No,  Reason:  Requiring oxygen.   Patient able to ambulate as they were prior to admission or with assist devices provided by therapies during their stay:  Yes  Patient MUST void prior to discharge:  Yes  Patient able to tolerate oral intake:  Yes  Pain has adequate pain control using Oral analgesics:  Yes    Problem: Obstructive Sleep Apnea Risk or Actual  Goal: Unobstructed Breathing During Sleep  Outcome: Improving     Problem: Mobility Impairment (Orthopaedic Rehabilitation)  Goal: Optimal Mobility Grahn and Safety  Outcome: Improving     VSS. On 2L of O2 maintaining sats at 93-94% while awake.  O2 weaning attempted but not successful- pt desaturating to 88% without O2. IS and coughing encouraged. Ambulating. Did not sleep much- peeing almost q hour(due to BP Diuretic meds)

## 2021-08-03 NOTE — PROGRESS NOTES
08/03/21 0900   Quick Adds   Type of Visit Initial Occupational Therapy Evaluation   Living Environment   People in home spouse   Current Living Arrangements house   Home Accessibility stairs to enter home;stairs within home   Number of Stairs, Main Entrance 1   Stair Railings, Main Entrance none   Number of Stairs, Within Home, Primary 6   Stair Railings, Within Home, Primary railing on left side (ascending)   Transportation Anticipated family or friend will provide   Self-Care   Usual Activity Tolerance good   Current Activity Tolerance good   Disability/Function   Wear Glasses or Blind yes   Vision Management glasses   General Information   Onset of Illness/Injury or Date of Surgery 08/02/21   Patient/Family Therapy Goal Statement (OT) To return home.   Right Upper Extremity (Weight-bearing Status) non weight-bearing (NWB)   Cognitive Status Examination   Orientation Status orientation to person, place and time   Bed Mobility   Bed Mobility rolling left   Rolling Left [place] (Bed Mobility) independent   Transfers   Transfers bed-chair transfer;sit-stand transfer;toilet transfer   Transfer Skill: Bed to Chair/Chair to Bed   Bed-Chair [place] (Transfers) independent   Sit-Stand Transfer   Sit-Stand [place] (Transfers) independent   Toilet Transfer   Type (Toilet Transfer) sit-stand;stand-sit   [place] Level (Toilet Transfer) independent   Balance   Balance Assessment standing balance: dynamic   Standing Balance: Dynamic good balance   Activities of Daily Living   BADL Assessment upper body dressing;lower body dressing;grooming;toileting   Upper Body Dressing Assessment   [place] Level (Upper Body Dressing) minimum assist (75% patient effort)   Position (Upper Body Dressing) supported sitting   Lower Body Dressing Assessment   [place] Level (Lower Body Dressing) independent   Assistive Devices (Lower Body Dressing) reacher   Position (Lower Body Dressing) supported  sitting;unsupported standing   Grooming Assessment   [place] Level (Grooming) independent   Position (Grooming) sink side   Toileting   [place] Level (Toileting) independent   Assistive Devices (Toileting) toilet paper aid   Position (Toileting) supported sitting;unsupported standing   Clinical Impression   Criteria for Skilled Therapeutic Interventions Met (OT) yes   OT Diagnosis decreased indep with ADLs   OT Problem List-Impairments impacting ADL mobility   Assessment of Occupational Performance 1-3 Performance Deficits   Identified Performance Deficits dressing, toileting, G/H   Planned Therapy Interventions (OT) ADL retraining;ROM   Clinical Decision Making Complexity (OT) moderate complexity   Therapy Frequency (OT) Daily   Predicted Duration of Therapy 1 day   Risk & Benefits of therapy have been explained patient;spouse/significant other   OT Discharge Planning    OT Discharge Recommendation (DC Rec) Home with assist   OT Rationale for DC Rec Pt will have assist from his wife at home.   Total Evaluation Time (Minutes)   Total Evaluation Time (Minutes) 15

## 2021-08-03 NOTE — PROGRESS NOTES
[facility] Daily Progress Note    Assessment/Plan:  50-year-old status post right shoulder arthroscopy with rotator cuff repair converted to open rotator cuff repair on 8/2/2021.    Essential hypertension  We will resume home lisinopril hydrochlorothiazide dosing.    Morbid obesity  Body mass index is 52.4 kg/m .  History of obstructive sleep apnea uses mouthguard at home.  Consider outpatient sleep study per primary care if increased symptoms or if wife notices more apneic episodes at home.    History of migraine headache  Continue Imitrex as needed  Active Problems:    S/P right rotator cuff repair     LOS: 0 days     Barriers to discharge: Per orthopedics  Discharge Disposition: Per orthopedics    Subjective:  Karson is sitting up in chair during her interview today.  He denies any chest pain or shortness of breath.  Overall pain is well controlled.  Had good sleep overnight not requiring supplemental oxygen this morning.       acetaminophen  975 mg Oral Q8H     aspirin  325 mg Oral At Bedtime     lisinopril-hydrochlorothiazide  1 tablet Oral Daily     polyethylene glycol  17 g Oral Daily     senna-docusate  1 tablet Oral BID     sodium chloride (PF)  3 mL Intracatheter Q8H       Objective:  Vital signs in last 24 hours:  Temp:  [97.2  F (36.2  C)-99  F (37.2  C)] 98.8  F (37.1  C)  Pulse:  [64-96] 75  Resp:  [13-35] 16  BP: (114-157)/() 117/60  FiO2 (%):  [4 %-30 %] 4 %  SpO2:  [89 %-98 %] 94 %  Weight:   Weight:   @THISENCWEIGHTS(1)@  Weight change:   Body mass index is 52.4 kg/m .    Intake/Output last 3 shifts:  I/O last 3 completed shifts:  In: 2630 [P.O.:1330; I.V.:1300]  Out: 2150 [Urine:2100; Blood:50]  Intake/Output this shift:  I/O this shift:  In: 400 [P.O.:400]  Out: 400 [Urine:400]    Review of Systems:   As per subjective, all others negative.    Physical Exam:    GENERAL:  Alert, appears comfortable, in no acute distress, appears stated age   HEAD:  Normocephalic, without obvious abnormality,  atraumatic   NECK: Supple, symmetrical, trachea midline   BACK:   Symmetric, no curvature, ROM normal   LUNGS:   Clear to auscultation bilaterally, no rales, rhonchi, or wheezing, symmetric chest rise on inhalation, respirations unlabored   CHEST WALL:  No tenderness or deformity   HEART:  Regular rate and rhythm, S1 and S2 normal, no murmur, rub, or gallop    ABDOMEN:   Soft, non-tender, bowel sounds active all four quadrants, no masses, no organomegaly, no rebound or guarding   EXTREMITIES:  Right arm in sling bandages in place no discharge noted   SKIN: Dry to touch, no exanthems in the visualized areas   NEURO: Alert, oriented x3, moves all four extremities freely, non-focal   PSYCH: Cooperative, behavior is appropriate      Imaging:  Personally Reviewed.  No results found for this visit on 08/02/21.    Lab Results:  Personally Reviewed.   Recent Labs   Lab 08/03/21  0715 08/02/21  1040   WBC  --  5.5   HGB 15.4 16.6  16.6   HCT  --  50.1   PLT  --  140*     No results for input(s): NA, CO2, BUN, CREATININE, ALBUMIN, BILITOT, ALKPHOS, ALT, AST, GLUCOSE in the last 168 hours.    Invalid input(s): K, CL, CALCIUM, LABALBU, PROT  No results for input(s): INR in the last 168 hours.    I reviewed all labs and imaging studies as of this date and I reviewed all current inpatient medications and updated them    Mikie Bautista DO, MS  [facility]  Internal Medicine

## 2021-08-03 NOTE — CONSULTS
INTERNAL MEDICINE CONSULT    Physician requesting consult: Dr. Gonzalez  Reason for consult: HTN      Assessment and Plan:    RIGHT SHOULDER ARTHROSCOPY ROTATOR CUFF REPAIR  CONVERTED TO OPEN ROTATOR CUFF REPAIR  Postop day #0.    Pain and bowel management per protocol  DVT protocol per ortho  Encourage PT/OT   Encourage incentive spirometery  Monitor Hb level  IVF      HTN  On lisinopril-hydrochlorothiazide, ordered with holding parameter to avoid postop hypotension     Morbidly obese   Body mass index is 52.4 kg/m .      JOSE ANTONIO  He has mouth guard at home  Continue pulse ox at sleep in the hospital     H/o migraine headache   On Imitrex as needed, ordered       HPI:     Sander Stafford is a 50 year old old male with past history significant for hypertension, JOSE ANTONIO, morbid obesity admitted postoperatively after he underwent a shoulder surgery.  Currently denies any complaints including pain, nausea, vomiting, dizziness or lightheadedness.    His medical problems have been addressed and assessment/plan section    Medical History  [unfilled]  [facility]  Patient Active Problem List    Diagnosis Date Noted     S/P right rotator cuff repair 08/02/2021     Priority: Medium        Surgical History  He  has no past surgical history on file.   History reviewed. No pertinent surgical history.    Allergies  Allergies   Allergen Reactions     Nuts Anaphylaxis     Tree nut allergy     Peanut Oil Anaphylaxis       Prior to Admission Medications   Medications Prior to Admission   Medication Sig Dispense Refill Last Dose     EPINEPHrine (ANY BX GENERIC EQUIV) 0.3 MG/0.3ML injection 2-pack Inject 0.3 mg into the muscle as needed for anaphylaxis   never     glucosamine-chondroitin 500-400 MG CAPS per capsule Take 1 capsule by mouth daily   More than a month     ibuprofen (ADVIL/MOTRIN) 200 MG tablet Take 600 mg by mouth every 6 hours as needed for mild pain   7/19/2021     lisinopril-hydrochlorothiazide (ZESTORETIC) 10-12.5 MG  "tablet Take 1 tablet by mouth daily   7/31/2021     magnesium 250 MG tablet Take 2 tablets by mouth daily   7/31/2021     multivitamin, therapeutic (THERA-VIT) TABS tablet Take 1 tablet by mouth daily   7/31/2021     propranolol (INDERAL) 10 MG tablet Take 10 mg by mouth daily as needed (prior to speaking)   More than a month     SUMAtriptan (IMITREX) 100 MG tablet Take 100 mg by mouth at onset of headache for migraine (repeat in two hours one time (max 200mg /24hrs))   More than a month       Social History  Reviewed, and he  reports that he has never smoked. He has never used smokeless tobacco. He reports current alcohol use. He reports that he does not use drugs.  Social History     Tobacco Use     Smoking status: Never Smoker     Smokeless tobacco: Never Used   Substance Use Topics     Alcohol use: Yes     Comment: occasional       Family History  Reviewed, and non-contributory     Review Of Systems  As per admission HPI, all others reviewed and negative.     Physical Exam:  BP (!) 145/70 (BP Location: Left arm)   Pulse 80   Temp 97.7  F (36.5  C) (Oral)   Resp 18   Ht 1.778 m (5' 10\")   Wt (!) 165.7 kg (365 lb 3.2 oz)   SpO2 93%   BMI 52.40 kg/m    General Appearance:  Awake Alert, orientedx3, not in any apparent distress   Head:  Normocephalic, without obvious abnormality   Eyes:  PERRL, conjunctiva/corneas clear   Throat: Oral mucosa moist   Neck: Supple,  no JVD   Lungs:   Clear to auscultation bilaterally, respirations unlabored   Chest Wall:  No tenderness   Heart:  Regular rate and rhythm, S1, S2 normal,no murmur   Abdomen:   Soft, non-tender, bowel sounds present,  no masses, no organomegaly   Extremities: Right arm in stabilizer   Skin: Skin color, texture, turgor normal, no rashes or lesions   Neurologic: Alert and oriented X 3, Moves all 4 extremities       Imaging:   No results found.          Radha Deleon M.D  [facility]  Internal Medicine    8/2/2021  7:51 PM        "

## 2021-08-03 NOTE — PROGRESS NOTES
Patient and wife were explained discharge instructions and they stated understanding.  Pt discharging home shortly with wife transporting.

## 2021-08-03 NOTE — PLAN OF CARE
Occupational Therapy Discharge Summary    Reason for therapy discharge:    All goals and outcomes met, no further needs identified.    Progress towards therapy goal(s). See goals on Care Plan in [facility] electronic health record for goal details.  Goals met    Therapy recommendation(s):    No further therapy is recommended.

## 2021-08-03 NOTE — PROGRESS NOTES
Pt stated he used to wear a CPAP for 4 years, pt was reassessed and switched to a mouthguard. Pt did not bring it with him today and lives 1 hour away, pt was told he can be monitored and placed on 02 if needed. Currently on 2L NC at 94%, no complications. RT following.

## 2021-08-03 NOTE — PLAN OF CARE
Patient vital signs are at baseline: No,  Reason:  Requiring oxygen.   Patient able to ambulate as they were prior to admission or with assist devices provided by therapies during their stay:  Yes  Patient MUST void prior to discharge:  Yes  Patient able to tolerate oral intake:  Yes  Pain has adequate pain control using Oral analgesics:  Yes      Patient denies pain in shoulder. Dressing has small amount of bloody drainage. Ambulating independently. Dyspnea with exertion, using 2 L via NC. Using call light to make needs known.

## 2021-10-02 ENCOUNTER — HEALTH MAINTENANCE LETTER (OUTPATIENT)
Age: 51
End: 2021-10-02

## 2022-05-14 ENCOUNTER — HEALTH MAINTENANCE LETTER (OUTPATIENT)
Age: 52
End: 2022-05-14

## 2022-09-03 ENCOUNTER — HEALTH MAINTENANCE LETTER (OUTPATIENT)
Age: 52
End: 2022-09-03

## 2023-06-02 ENCOUNTER — HEALTH MAINTENANCE LETTER (OUTPATIENT)
Age: 53
End: 2023-06-02

## 2024-07-06 ENCOUNTER — HEALTH MAINTENANCE LETTER (OUTPATIENT)
Age: 54
End: 2024-07-06

## (undated) DEVICE — CUSTOM CATH LAB BASIN SET PACK 640PACK LHE SUTCNBPFCA

## (undated) DEVICE — DRSG GAUZE 4X4" 3033

## (undated) DEVICE — SU TIGERTAPE 2MMX7"  AR-7237-7T

## (undated) DEVICE — KIT SHOULDER POSITIONING BEACH CHAIR ARM HOLDER AR-1644

## (undated) DEVICE — DRSG TEGADERM 2 3/8X2 3/4" 1624W

## (undated) DEVICE — GLOVE UNDER INDICATOR PI SZ 8.5 LF 41685

## (undated) DEVICE — BLADE CLIPPER PIVOT PURPLE DISP 9660

## (undated) DEVICE — SU FIBERWIRE 2 38" T-8 NDL  AR-7206

## (undated) DEVICE — SOL NACL 0.9% IRRIG 1000ML BOTTLE 2F7124

## (undated) DEVICE — ARTHROSCOPIC CANNULA TWIST-IN PURPLE 7MMX7CM AR-6570

## (undated) DEVICE — SOL WATER IRRIG 1000ML BOTTLE 2F7114

## (undated) DEVICE — SU FIBERTAPE 2MM  AR-7237-7

## (undated) DEVICE — DRAPE IOBAN INCISE 23X17" 6650EZ

## (undated) DEVICE — SUCTION MANIFOLD NEPTUNE 2 SYS 4 PORT 0702-020-000

## (undated) DEVICE — BUR ARTHREX COOLCUT BONE CUTTER 5.0MMX13CM AR-8500BC

## (undated) DEVICE — MAT FLOOR SURGICAL 40X38 0702140238

## (undated) DEVICE — DRESSING MEPILEX BORDER POST-OP 4X8

## (undated) DEVICE — GOWN IMPERVIOUS BREATHABLE SMART LG 89015

## (undated) DEVICE — DRAPE SLEEVE ARTHREX STAR SHOULDER TRACTION  AR-1606

## (undated) DEVICE — CUSTOM PACK LATERALSHOULDER SCOPE PRE SOP5BLSHEA

## (undated) DEVICE — PLATE GROUNDING ADULT W/CORD 9165L

## (undated) DEVICE — TUBING SYSTEM ARTHREX PUMP REDEUCE AR-6411

## (undated) DEVICE — GLOVE UNDER INDICATOR PI SZ 7.0 LF 41670

## (undated) DEVICE — CUSTOM PACK OPEN SHOULDER SOP5BOSHEB

## (undated) DEVICE — ARTHROSCOPIC CANNULA 7MMX7CM PURPLE  AR-6550

## (undated) DEVICE — NEEDLE SUTURE ANCHOR 1824-5DC

## (undated) DEVICE — BUR ARTHREX FLUSHCUT OVAL 5.0MMX13CM AR-8500FOE

## (undated) DEVICE — SOLUTION IRRIG 2B7127 .9NS 3000ML BAG

## (undated) DEVICE — NDL ARTHREX MULTIFIRE/FASTPASS SCORPION AR-13995N

## (undated) DEVICE — GLOVE BIOGEL PI ULTRATOUCH G SZ 8.5 42185

## (undated) DEVICE — GOWN IMPERVIOUS BREATHABLE 2XL/XLONG

## (undated) DEVICE — IMMOBILIZER SUPER SLING LG B&C 08148294

## (undated) DEVICE — POSITIONER SCHLEIN KIT BC-1000X

## (undated) DEVICE — ABLATOR ARTHREX APOLLO RF MP90 ASPIRATING 90DEG AR-9811

## (undated) DEVICE — HOLDER LIMB VELCRO OR 0814-1533

## (undated) DEVICE — TUBING SYSTEM ARTHREX PATIENT REDEUCE AR-6421

## (undated) DEVICE — DRESSING XEROFORM PETROLATUM 5X9 33605

## (undated) DEVICE — DRSG STERI STRIP 1/2X4" R1547

## (undated) DEVICE — GLOVE BIOGEL PI ULTRATOUCH G SZ 7.0 42170

## (undated) DEVICE — GLOVE BIOGEL PI ULTRATOUCH G SZ 6.5 42165